# Patient Record
Sex: MALE | Race: WHITE | NOT HISPANIC OR LATINO | Employment: OTHER | URBAN - NONMETROPOLITAN AREA
[De-identification: names, ages, dates, MRNs, and addresses within clinical notes are randomized per-mention and may not be internally consistent; named-entity substitution may affect disease eponyms.]

---

## 2017-06-05 ENCOUNTER — TELEPHONE (OUTPATIENT)
Dept: SURGERY | Facility: CLINIC | Age: 77
End: 2017-06-05

## 2017-06-05 NOTE — TELEPHONE ENCOUNTER
pt canceled 1 yr f/u h-pylori stated he was going to PCP Nemesio Sanford first and then he would call and make appt with Dr Obando

## 2017-06-12 ENCOUNTER — OFFICE VISIT (OUTPATIENT)
Dept: SURGERY | Facility: CLINIC | Age: 77
End: 2017-06-12

## 2017-06-12 VITALS
OXYGEN SATURATION: 98 % | RESPIRATION RATE: 16 BRPM | SYSTOLIC BLOOD PRESSURE: 130 MMHG | BODY MASS INDEX: 23.29 KG/M2 | TEMPERATURE: 98.2 F | DIASTOLIC BLOOD PRESSURE: 74 MMHG | WEIGHT: 148.4 LBS | HEART RATE: 59 BPM | HEIGHT: 67 IN

## 2017-06-12 DIAGNOSIS — B96.81 HELICOBACTER PYLORI GASTRITIS: ICD-10-CM

## 2017-06-12 DIAGNOSIS — K29.70 HELICOBACTER PYLORI GASTRITIS: ICD-10-CM

## 2017-06-12 DIAGNOSIS — K21.00 GASTROESOPHAGEAL REFLUX DISEASE WITH ESOPHAGITIS: Primary | ICD-10-CM

## 2017-06-12 PROCEDURE — 99214 OFFICE O/P EST MOD 30 MIN: CPT | Performed by: SURGERY

## 2017-06-12 RX ORDER — EZETIMIBE 10 MG/1
10 TABLET ORAL DAILY
COMMUNITY

## 2017-06-12 RX ORDER — OMEPRAZOLE 20 MG/1
20 CAPSULE, DELAYED RELEASE ORAL DAILY
COMMUNITY
End: 2020-12-11

## 2017-06-12 RX ORDER — CITALOPRAM 10 MG/1
10 TABLET ORAL DAILY
COMMUNITY

## 2017-06-12 RX ORDER — ATORVASTATIN CALCIUM 20 MG/1
20 TABLET, FILM COATED ORAL DAILY
COMMUNITY

## 2017-06-12 NOTE — PROGRESS NOTES
"Patient: Eulalio Frazier    YOB: 1940    Date: 06/12/2017    Primary Care Provider: Ady Sanford MD    Reason for Consultation: history of h-pylori    Chief Complaint:   Chief Complaint   Patient presents with   • Follow-up     1 year for h-pylori       History: Patient is here for 1 year follow up after he was diagnosed with h-pylori and having an EGD. He states he drinks boost every day. He was suffering from weight loss due to decreased appetite. He denies any N/V/D today. Patient improving with PPI medication.  Comes in to see if gastritis has resolved.  Still having weight loss unfortunately.    Review of Systems   Constitutional: Negative for chills, fever and unexpected weight change.   HENT: Negative for trouble swallowing and voice change.    Eyes: Negative for visual disturbance.   Respiratory: Negative for apnea, cough, chest tightness, shortness of breath and wheezing.    Cardiovascular: Negative for chest pain, palpitations and leg swelling.   Gastrointestinal: Negative for abdominal distention, abdominal pain, anal bleeding, blood in stool, constipation, diarrhea, nausea, rectal pain and vomiting.   Endocrine: Negative for cold intolerance and heat intolerance.   Genitourinary: Negative for difficulty urinating, dysuria, flank pain, scrotal swelling and testicular pain.   Musculoskeletal: Negative for back pain, gait problem and joint swelling.   Skin: Negative for color change, rash and wound.   Neurological: Negative for dizziness, syncope, speech difficulty, weakness, numbness and headaches.   Hematological: Negative for adenopathy. Does not bruise/bleed easily.   Psychiatric/Behavioral: Negative for confusion. The patient is not nervous/anxious.        Vital Signs  /74  Pulse 59  Temp 98.2 °F (36.8 °C) (Temporal Artery )   Resp 16  Ht 66.5\" (168.9 cm)  Wt 148 lb 6.4 oz (67.3 kg)  SpO2 98%  BMI 23.59 kg/m2    Allergies:  Allergies   Allergen Reactions   • Sulfa " Antibiotics        Medications:    Current Outpatient Prescriptions:   •  atorvastatin (LIPITOR) 20 MG tablet, Take 20 mg by mouth Daily., Disp: , Rfl:   •  citalopram (CeleXA) 10 MG tablet, Take 10 mg by mouth Daily., Disp: , Rfl:   •  ezetimibe (ZETIA) 10 MG tablet, Take 10 mg by mouth Daily., Disp: , Rfl:   •  omeprazole (priLOSEC) 20 MG capsule, Take 20 mg by mouth Daily., Disp: , Rfl:   •  Prenatal Vit-Fe Fumarate-FA (M-VIT PO), Take 1 tablet by mouth Daily., Disp: , Rfl:   •  saw palmetto 160 MG capsule, Take 160 mg by mouth 2 (Two) Times a Day., Disp: , Rfl:     Physical Exam:   General Appearance:    Alert, cooperative, in no acute distress   Head:    Normocephalic, without obvious abnormality, atraumatic   Lungs:     Clear to auscultation,respirations regular, even and                  unlabored    Heart:    Regular rhythm and normal rate, normal S1 and S2, no            murmur, no gallop, no rub, no click   Abdomen:     Normal bowel sounds, no masses, no organomegaly, soft        non-tender, non-distended, no guarding, no rebound                tenderness   Extremities:   Moves all extremities well, no edema, no cyanosis, no             redness   Pulses:   Pulses palpable and equal bilaterally   Skin:   No bleeding, bruising or rash      Assessment/Plan     1. Gastroesophageal reflux disease with esophagitis    2. Helicobacter pylori gastritis        Fabian Obando MD  06/12/17

## 2017-06-20 ENCOUNTER — TELEPHONE (OUTPATIENT)
Dept: SURGERY | Facility: CLINIC | Age: 77
End: 2017-06-20

## 2018-06-21 ENCOUNTER — APPOINTMENT (OUTPATIENT)
Dept: GENERAL RADIOLOGY | Facility: HOSPITAL | Age: 78
End: 2018-06-21

## 2018-06-21 ENCOUNTER — HOSPITAL ENCOUNTER (EMERGENCY)
Facility: HOSPITAL | Age: 78
Discharge: HOME | End: 2018-06-21
Attending: EMERGENCY MEDICINE | Admitting: EMERGENCY MEDICINE

## 2018-06-21 VITALS
DIASTOLIC BLOOD PRESSURE: 73 MMHG | WEIGHT: 144.6 LBS | BODY MASS INDEX: 22.7 KG/M2 | HEART RATE: 64 BPM | TEMPERATURE: 98 F | RESPIRATION RATE: 16 BRPM | OXYGEN SATURATION: 100 % | HEIGHT: 67 IN | SYSTOLIC BLOOD PRESSURE: 153 MMHG

## 2018-06-21 DIAGNOSIS — M54.2 NECK PAIN: Primary | ICD-10-CM
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
## 2018-06-21 PROCEDURE — 71046 X-RAY EXAM CHEST 2 VIEWS: CPT

## 2018-06-21 PROCEDURE — 99282 EMERGENCY DEPT VISIT SF MDM: CPT

## 2018-06-21 PROCEDURE — 72040 X-RAY EXAM NECK SPINE 2-3 VW: CPT

## 2019-04-26 ENCOUNTER — OFFICE VISIT (OUTPATIENT)
Dept: PULMONOLOGY | Facility: CLINIC | Age: 79
End: 2019-04-26

## 2019-04-26 VITALS
HEART RATE: 63 BPM | WEIGHT: 154 LBS | OXYGEN SATURATION: 98 % | DIASTOLIC BLOOD PRESSURE: 70 MMHG | RESPIRATION RATE: 16 BRPM | BODY MASS INDEX: 24.17 KG/M2 | HEIGHT: 67 IN | SYSTOLIC BLOOD PRESSURE: 118 MMHG

## 2019-04-26 DIAGNOSIS — R91.8 LUNG NODULE, MULTIPLE: Primary | ICD-10-CM

## 2019-04-26 PROCEDURE — 99204 OFFICE O/P NEW MOD 45 MIN: CPT | Performed by: INTERNAL MEDICINE

## 2019-04-26 RX ORDER — METOPROLOL SUCCINATE 25 MG/1
TABLET, EXTENDED RELEASE ORAL
COMMUNITY
Start: 2019-04-23 | End: 2020-12-11

## 2019-04-26 NOTE — PROGRESS NOTES
"CONSULT NOTE    Requested by:   Baylee Lockwood AP*   Ady Sanford MD      Chief Complaint   Patient presents with   • Consult   • Breathing Problem       Subjective:  Euallio Frazier is a 78 y.o. male.     History of Present Illness   Patient comes in today for consultation because of abnormal CT.    Patient underwent a CT due to flu like symptoms. he was told that the imaging study showed 2-3 <6 millimeter lung nodule for which a pulmonology consultation was requested    Patient says that he is a non-smoker. The patient describes no family members with a history of lung cancer.        The following portions of the patient's history were reviewed and updated as appropriate: allergies, current medications, past family history, past medical history, past social history and past surgical history.    Review of Systems   HENT: Negative for sinus pressure, sneezing and sore throat.    Respiratory: Negative for cough, chest tightness, shortness of breath and wheezing.    Cardiovascular: Negative for palpitations and leg swelling.   Psychiatric/Behavioral: Negative for sleep disturbance.   All other systems reviewed and are negative.      Past Medical History:   Diagnosis Date   • Anxiety    • GERD (gastroesophageal reflux disease)    • H/O pyloric stenosis    • Hyperlipidemia    • Insomnia        Social History     Tobacco Use   • Smoking status: Never Smoker   • Smokeless tobacco: Never Used   Substance Use Topics   • Alcohol use: No         Objective:  Visit Vitals  /70   Pulse 63   Resp 16   Ht 170.2 cm (67.01\")   Wt 69.9 kg (154 lb)   SpO2 98%   BMI 24.11 kg/m²       Physical Exam   Constitutional: He is oriented to person, place, and time. He appears well-developed and well-nourished.   HENT:   Head: Atraumatic.   Eyes: EOM are normal.   Neck: Neck supple. No JVD present. No thyromegaly present.   Cardiovascular: Normal rate and regular rhythm.   No murmur heard.  Pulmonary/Chest: Effort normal. No " respiratory distress. He has no wheezes. He has no rales.   Musculoskeletal:   Gait was normal.   Neurological: He is alert and oriented to person, place, and time.   Skin: Skin is warm and dry.   Psychiatric: He has a normal mood and affect. His behavior is normal.   Vitals reviewed.      Assessment/Plan:  uElalio was seen today for consult and breathing problem.    Diagnoses and all orders for this visit:    Lung nodule, multiple  -     CT Chest Without Contrast; Future        Return in about 1 year (around 4/26/2020) for Recheck, To be seen in [place].    DISCUSSION(if any):  CT reviewed with the patient. Nodules were reviewed with him.      Based on the history obtained today, and based on the latest guidelines, the patient will need a repeat CT chest in 12 months.    Based on the results of the CT scan, further recommendations will be made.    The patient was asked to call this office if he develops any weight loss, hemoptysis, night sweats etc.      Dictated utilizing Dragon dictation.    This document was electronically signed by Piedad Garrido MD on 04/26/19 at 10:09 AM

## 2020-07-10 ENCOUNTER — OFFICE VISIT (OUTPATIENT)
Dept: PULMONOLOGY | Facility: CLINIC | Age: 80
End: 2020-07-10

## 2020-07-10 VITALS
RESPIRATION RATE: 18 BRPM | WEIGHT: 148 LBS | DIASTOLIC BLOOD PRESSURE: 60 MMHG | TEMPERATURE: 98.6 F | BODY MASS INDEX: 23.78 KG/M2 | HEIGHT: 66 IN | SYSTOLIC BLOOD PRESSURE: 110 MMHG | HEART RATE: 60 BPM | OXYGEN SATURATION: 96 %

## 2020-07-10 DIAGNOSIS — J30.89 OTHER ALLERGIC RHINITIS: ICD-10-CM

## 2020-07-10 DIAGNOSIS — R93.89 ABNORMAL CT OF THE CHEST: ICD-10-CM

## 2020-07-10 DIAGNOSIS — R91.8 LUNG NODULE, MULTIPLE: Primary | ICD-10-CM

## 2020-07-10 PROCEDURE — 99213 OFFICE O/P EST LOW 20 MIN: CPT | Performed by: INTERNAL MEDICINE

## 2020-07-10 RX ORDER — ZOLPIDEM TARTRATE 10 MG/1
TABLET ORAL
COMMUNITY
Start: 2020-06-26

## 2020-07-10 RX ORDER — ESOMEPRAZOLE MAGNESIUM 40 MG/1
40 CAPSULE, DELAYED RELEASE ORAL
COMMUNITY

## 2020-07-10 RX ORDER — FLUNISOLIDE 0.25 MG/ML
1 SOLUTION NASAL DAILY
Qty: 1 BOTTLE | Refills: 5 | Status: SHIPPED | OUTPATIENT
Start: 2020-07-10 | End: 2022-03-22 | Stop reason: SDUPTHER

## 2020-12-07 NOTE — PROGRESS NOTES
Patient: Eulalio Frazier    YOB: 1940    Date: 12/11/2020    Primary Care Provider: Ady Sanford MD    Chief Complaint   Patient presents with   • Follow-up     Patient had positive cologuard test. He is here for evaluation fro colonoscopy.       SUBJECTIVE:    History of present illness:  I saw the patient in the office today as a consultation for evaluation and treatment of positive cologuard.  Last colonoscopy 3-1/2 to 4 years ago.  Patient with history of polyps.  No rectal bleeding or abdominal pain.    The following portions of the patient's history were reviewed and updated as appropriate: allergies, current medications, past family history, past medical history, past social history, past surgical history and problem list.    Review of Systems   Constitutional: Negative for chills, fever and unexpected weight change.   HENT: Negative for trouble swallowing and voice change.    Eyes: Negative for visual disturbance.   Respiratory: Negative for apnea, cough, chest tightness and wheezing.    Cardiovascular: Negative for chest pain, palpitations and leg swelling.   Gastrointestinal: Negative for abdominal distention, abdominal pain, anal bleeding, blood in stool, constipation, diarrhea, nausea, rectal pain and vomiting.   Endocrine: Negative for cold intolerance and heat intolerance.   Genitourinary: Negative for difficulty urinating, dysuria, flank pain and hematuria.   Musculoskeletal: Negative for back pain, gait problem and joint swelling.   Skin: Negative for color change, rash and wound.   Neurological: Negative for dizziness, syncope, speech difficulty, weakness, numbness and headaches.   Hematological: Negative for adenopathy. Does not bruise/bleed easily.   Psychiatric/Behavioral: Negative for confusion. The patient is not nervous/anxious.        History:  Past Medical History:   Diagnosis Date   • Anxiety    • GERD (gastroesophageal reflux disease)    • H/O pyloric stenosis    •  "Hyperlipidemia    • Insomnia        Past Surgical History:   Procedure Laterality Date   • CERVICAL FUSION     • ENDOSCOPY  2016   • GALLBLADDER SURGERY     • HERNIA REPAIR         History reviewed. No pertinent family history.    Social History     Tobacco Use   • Smoking status: Never Smoker   • Smokeless tobacco: Never Used   Substance Use Topics   • Alcohol use: No   • Drug use: No       Medications:   Current Outpatient Medications:   •  atorvastatin (LIPITOR) 20 MG tablet, Take 20 mg by mouth Daily., Disp: , Rfl:   •  citalopram (CeleXA) 10 MG tablet, Take 10 mg by mouth Daily., Disp: , Rfl:   •  esomeprazole (nexIUM) 40 MG capsule, Take 40 mg by mouth Every Morning Before Breakfast., Disp: , Rfl:   •  ezetimibe (ZETIA) 10 MG tablet, Take 10 mg by mouth Daily., Disp: , Rfl:   •  flunisolide (NASALIDE) 25 MCG/ACT (0.025%) solution nasal spray, Inhale 1 spray Daily., Disp: 1 bottle, Rfl: 5  •  Multiple Vitamin (MULTI-VITAMIN DAILY PO), Take  by mouth., Disp: , Rfl:   •  zolpidem (AMBIEN) 10 MG tablet, , Disp: , Rfl:        Allergies:   Allergies   Allergen Reactions   • Sulfa Antibiotics        OBJECTIVE:    Vital Signs:  Vitals:    12/11/20 1532   BP: 120/70   Pulse: 70   Temp: 97 °F (36.1 °C)   SpO2: 98%   Weight: 66.6 kg (146 lb 12.8 oz)   Height: 167.6 cm (66\")       Physical Exam:   General Appearance:    Alert, cooperative, in no acute distress   Head:    Normocephalic, without obvious abnormality, atraumatic   Eyes:            Lids and lashes normal, conjunctivae and sclerae normal, no   icterus, no pallor, corneas clear, PERRL   Ears:    Ears appear intact with no abnormalities noted   Throat:   No oral lesions, no thrush, oral mucosa moist   Neck:   No adenopathy, supple, trachea midline, no thyromegaly,  no JVD   Lungs:     Clear to auscultation,respirations regular, even and                  unlabored    Heart:    Regular rhythm and normal rate, normal S1 and S2, no            murmur   Abdomen:     no " masses, no organomegaly, soft non-tender, non-distended, no guarding.  No abdominal wall masses or hernias.   Extremities:   Moves all extremities well, no edema, no cyanosis, no             redness   Pulses:   Pulses palpable and equal bilaterally   Skin:   No bleeding, bruising or rash   Lymph nodes:   No palpable adenopathy   Neurologic:   Cranial nerves 2 - 12 grossly intact, sensation intact  Psychiatric: No evidence of depression or anxiety   Results Review:   I reviewed the patient's new clinical results.    Review of Systems was reviewed and confirmed as accurate as documented by the MA.    ASSESSMENT/PLAN:    1. Heme positive stool    2. History of colon polyps        I recommend a colonoscopy for further evaluation. The procedure was explained as well as the risks which include but are not limited to bleeding, infection, perforation, abdominal pain etc. The patient understands these risks and the procedure and wishes to proceed.  Continue high-fiber diet as well as probiotics daily to alleviate and help colon health.     Electronically signed by Fabian Obando MD  12/11/20  10:31 EST

## 2020-12-11 ENCOUNTER — OFFICE VISIT (OUTPATIENT)
Dept: SURGERY | Facility: CLINIC | Age: 80
End: 2020-12-11

## 2020-12-11 VITALS
TEMPERATURE: 97 F | WEIGHT: 146.8 LBS | OXYGEN SATURATION: 98 % | SYSTOLIC BLOOD PRESSURE: 120 MMHG | BODY MASS INDEX: 23.59 KG/M2 | HEIGHT: 66 IN | HEART RATE: 70 BPM | DIASTOLIC BLOOD PRESSURE: 70 MMHG

## 2020-12-11 DIAGNOSIS — R19.5 HEME POSITIVE STOOL: Primary | ICD-10-CM

## 2020-12-11 DIAGNOSIS — Z01.818 PRE-OP TESTING: Primary | ICD-10-CM

## 2020-12-11 DIAGNOSIS — Z86.010 HISTORY OF COLON POLYPS: ICD-10-CM

## 2020-12-11 PROCEDURE — 99213 OFFICE O/P EST LOW 20 MIN: CPT | Performed by: SURGERY

## 2020-12-11 RX ORDER — POLYETHYLENE GLYCOL 3350 17 G/17G
238 POWDER, FOR SOLUTION ORAL ONCE
Qty: 14 PACKET | Refills: 0 | Status: SHIPPED | OUTPATIENT
Start: 2020-12-11 | End: 2020-12-11

## 2020-12-11 RX ORDER — BISACODYL 5 MG
TABLET, DELAYED RELEASE (ENTERIC COATED) ORAL
Qty: 4 TABLET | Refills: 0 | Status: SHIPPED | OUTPATIENT
Start: 2020-12-11

## 2020-12-26 ENCOUNTER — LAB (OUTPATIENT)
Dept: LAB | Facility: HOSPITAL | Age: 80
End: 2020-12-26

## 2020-12-26 DIAGNOSIS — Z01.818 PRE-OP TESTING: ICD-10-CM

## 2020-12-26 LAB — SARS-COV-2 RNA RESP QL NAA+PROBE: NOT DETECTED

## 2020-12-26 PROCEDURE — U0004 COV-19 TEST NON-CDC HGH THRU: HCPCS

## 2020-12-26 PROCEDURE — C9803 HOPD COVID-19 SPEC COLLECT: HCPCS

## 2020-12-29 ENCOUNTER — OUTSIDE FACILITY SERVICE (OUTPATIENT)
Dept: SURGERY | Facility: CLINIC | Age: 80
End: 2020-12-29

## 2020-12-29 ENCOUNTER — HOSPITAL ENCOUNTER (EMERGENCY)
Facility: HOSPITAL | Age: 80
Discharge: HOME | End: 2020-12-29
Attending: EMERGENCY MEDICINE | Admitting: EMERGENCY MEDICINE

## 2020-12-29 VITALS
HEART RATE: 87 BPM | BODY MASS INDEX: 23.98 KG/M2 | HEIGHT: 66 IN | SYSTOLIC BLOOD PRESSURE: 124 MMHG | WEIGHT: 149.2 LBS | DIASTOLIC BLOOD PRESSURE: 66 MMHG | OXYGEN SATURATION: 96 % | RESPIRATION RATE: 16 BRPM | TEMPERATURE: 98.7 F

## 2020-12-29 DIAGNOSIS — Z87.438 HISTORY OF BPH: ICD-10-CM

## 2020-12-29 DIAGNOSIS — R33.8 ACUTE URINARY RETENTION: Primary | ICD-10-CM

## 2020-12-29 LAB
BACTERIA UR QL AUTO: ABNORMAL /HPF
BILIRUB UR QL STRIP: NEGATIVE
CLARITY UR: CLEAR
COLOR UR: YELLOW
GLUCOSE UR STRIP-MCNC: NEGATIVE MG/DL
HGB UR QL STRIP.AUTO: NEGATIVE
HYALINE CASTS UR QL AUTO: ABNORMAL /LPF
KETONES UR QL STRIP: NEGATIVE
LEUKOCYTE ESTERASE UR QL STRIP.AUTO: NEGATIVE
NITRITE UR QL STRIP: NEGATIVE
PH UR STRIP.AUTO: 6.5 [PH] (ref 5–8)
PROT UR QL STRIP: NEGATIVE
RBC # UR: ABNORMAL /HPF
REF LAB TEST METHOD: ABNORMAL
SP GR UR STRIP: 1.01 (ref 1–1.03)
SQUAMOUS #/AREA URNS HPF: ABNORMAL /HPF
UROBILINOGEN UR QL STRIP: NORMAL
WBC UR QL AUTO: ABNORMAL /HPF

## 2020-12-29 PROCEDURE — 99283 EMERGENCY DEPT VISIT LOW MDM: CPT

## 2020-12-29 PROCEDURE — 45384 COLONOSCOPY W/LESION REMOVAL: CPT | Performed by: SURGERY

## 2020-12-29 PROCEDURE — 51702 INSERT TEMP BLADDER CATH: CPT

## 2020-12-29 PROCEDURE — 81001 URINALYSIS AUTO W/SCOPE: CPT | Performed by: EMERGENCY MEDICINE

## 2021-03-09 DIAGNOSIS — R93.89 ABNORMAL CT OF THE CHEST: ICD-10-CM

## 2021-03-09 DIAGNOSIS — R91.8 LUNG NODULE, MULTIPLE: ICD-10-CM

## 2021-05-18 ENCOUNTER — OFFICE VISIT (OUTPATIENT)
Dept: PULMONOLOGY | Facility: CLINIC | Age: 81
End: 2021-05-18

## 2021-05-18 VITALS
BODY MASS INDEX: 23.46 KG/M2 | RESPIRATION RATE: 16 BRPM | OXYGEN SATURATION: 96 % | DIASTOLIC BLOOD PRESSURE: 62 MMHG | HEIGHT: 66 IN | HEART RATE: 78 BPM | WEIGHT: 146 LBS | SYSTOLIC BLOOD PRESSURE: 104 MMHG

## 2021-05-18 DIAGNOSIS — R93.89 ABNORMAL CT OF THE CHEST: ICD-10-CM

## 2021-05-18 DIAGNOSIS — R91.8 LUNG NODULE, MULTIPLE: Primary | ICD-10-CM

## 2021-05-18 PROCEDURE — 99213 OFFICE O/P EST LOW 20 MIN: CPT | Performed by: INTERNAL MEDICINE

## 2021-05-18 NOTE — PROGRESS NOTES
"  Chief Complaint   Patient presents with   • Follow-up     Lung Nodule          Subjective   Eulalio Frazier is a 80 y.o. male.     History of Present Illness   Patient was evaluated today to discuss the results of CT scan.    The patient denies any night sweats, weight loss or hemoptysis.       The following portions of the patient's history were reviewed and updated as appropriate: allergies, current medications, past family history, past medical history, past social history and past surgical history.    Review of Systems   Constitutional: Negative for chills and fever.   HENT: Positive for rhinorrhea, sinus pressure and sneezing. Negative for sore throat.    Respiratory: Positive for cough. Negative for shortness of breath and wheezing.    Psychiatric/Behavioral: Negative for sleep disturbance.       Objective   Visit Vitals  /62   Pulse 78   Resp 16   Ht 167.6 cm (66\")   Wt 66.2 kg (146 lb)   SpO2 96%   BMI 23.57 kg/m²       Physical Exam  Vitals reviewed.   Constitutional:       Appearance: He is well-developed.   Neck:      Vascular: No JVD.   Pulmonary:      Effort: Pulmonary effort is normal. No respiratory distress.      Breath sounds: Normal breath sounds. No wheezing or rales.   Musculoskeletal:      Cervical back: Neck supple.      Comments: Gait was normal   Skin:     General: Skin is warm and dry.   Neurological:      Mental Status: He is alert and oriented to person, place, and time.         Assessment/Plan   Diagnoses and all orders for this visit:    1. Lung nodule, multiple (Primary)  -     CT Chest Without Contrast Diagnostic; Future    2. Abnormal CT of the chest  -     CT Chest Without Contrast Diagnostic; Future           Return in about 5 months (around 10/18/2021) for Recheck, Imaging, For Jamilah.    DISCUSSION (if any):  Patient was evaluated today to discuss the results of CT scan & Last CT scan was reviewed in great detail with the patient. Images reviewed personally.   Results for " orders placed in visit on 03/09/21  CT Chest Without Contrast          The CT scan shows that the lung nodules have remained stable. There was no acute change/process identified    Repeat CT will be scheduled for Sept 2021.     If the next CT shows no change, then we will consider a CT in 1 year.     Total time of patient care on day of service including time prior to, face to face with patient, and following visit spent in reviewing imaging studies, discussion with patient, and documentation/charting was 24 minutes.    Dictated utilizing Dragon dictation.    This document was electronically signed by Piedad Grarido MD on 05/18/21 at 14:59 EDT

## 2021-10-21 DIAGNOSIS — R93.89 ABNORMAL CT OF THE CHEST: ICD-10-CM

## 2021-10-21 DIAGNOSIS — R91.8 LUNG NODULE, MULTIPLE: Primary | ICD-10-CM

## 2021-11-01 DIAGNOSIS — R93.89 ABNORMAL CT OF THE CHEST: ICD-10-CM

## 2021-11-01 DIAGNOSIS — R91.8 LUNG NODULE, MULTIPLE: ICD-10-CM

## 2022-03-22 ENCOUNTER — OFFICE VISIT (OUTPATIENT)
Dept: PULMONOLOGY | Facility: CLINIC | Age: 82
End: 2022-03-22

## 2022-03-22 VITALS
BODY MASS INDEX: 24.43 KG/M2 | SYSTOLIC BLOOD PRESSURE: 136 MMHG | HEIGHT: 66 IN | OXYGEN SATURATION: 99 % | HEART RATE: 76 BPM | WEIGHT: 152 LBS | DIASTOLIC BLOOD PRESSURE: 68 MMHG

## 2022-03-22 DIAGNOSIS — R91.8 LUNG NODULE, MULTIPLE: Primary | ICD-10-CM

## 2022-03-22 DIAGNOSIS — R93.89 ABNORMAL CT OF THE CHEST: ICD-10-CM

## 2022-03-22 DIAGNOSIS — J20.9 ACUTE BRONCHITIS, UNSPECIFIED ORGANISM: ICD-10-CM

## 2022-03-22 DIAGNOSIS — R05.9 COUGH: ICD-10-CM

## 2022-03-22 PROCEDURE — 99214 OFFICE O/P EST MOD 30 MIN: CPT | Performed by: NURSE PRACTITIONER

## 2022-03-22 PROCEDURE — 95012 NITRIC OXIDE EXP GAS DETER: CPT | Performed by: NURSE PRACTITIONER

## 2022-03-22 RX ORDER — FLUTICASONE FUROATE 200 UG/1
1 POWDER RESPIRATORY (INHALATION) DAILY
Qty: 30 EACH | Refills: 2 | Status: SHIPPED | OUTPATIENT
Start: 2022-03-22

## 2022-03-22 RX ORDER — AMOXICILLIN AND CLAVULANATE POTASSIUM 875; 125 MG/1; MG/1
TABLET, FILM COATED ORAL
COMMUNITY
Start: 2022-01-18

## 2022-03-22 RX ORDER — MONTELUKAST SODIUM 10 MG/1
10 TABLET ORAL DAILY
COMMUNITY
Start: 2022-01-14

## 2022-03-22 RX ORDER — FLUNISOLIDE 0.25 MG/ML
1 SOLUTION NASAL DAILY
Qty: 25 ML | Refills: 6 | Status: SHIPPED | OUTPATIENT
Start: 2022-03-22

## 2022-03-22 RX ORDER — PREDNISONE 20 MG/1
TABLET ORAL
Qty: 10 TABLET | Refills: 0 | Status: SHIPPED | OUTPATIENT
Start: 2022-03-22

## 2022-10-31 ENCOUNTER — HOSPITAL ENCOUNTER (OUTPATIENT)
Dept: CT IMAGING | Facility: HOSPITAL | Age: 82
Discharge: HOME | End: 2022-10-31
Admitting: NURSE PRACTITIONER

## 2022-10-31 DIAGNOSIS — R93.89 ABNORMAL CT OF THE CHEST: ICD-10-CM

## 2022-10-31 DIAGNOSIS — R91.8 LUNG NODULE, MULTIPLE: ICD-10-CM

## 2022-10-31 PROCEDURE — 71250 CT THORAX DX C-: CPT

## 2022-11-01 DIAGNOSIS — R91.8 LUNG NODULE, MULTIPLE: Primary | ICD-10-CM

## 2022-11-22 ENCOUNTER — OFFICE VISIT (OUTPATIENT)
Dept: PULMONOLOGY | Facility: CLINIC | Age: 82
End: 2022-11-22

## 2022-11-22 VITALS
BODY MASS INDEX: 25.23 KG/M2 | DIASTOLIC BLOOD PRESSURE: 72 MMHG | WEIGHT: 157 LBS | OXYGEN SATURATION: 97 % | HEIGHT: 66 IN | SYSTOLIC BLOOD PRESSURE: 124 MMHG | HEART RATE: 70 BPM

## 2022-11-22 DIAGNOSIS — R91.8 LUNG NODULE, MULTIPLE: Primary | ICD-10-CM

## 2022-11-22 DIAGNOSIS — R93.89 ABNORMAL CT OF THE CHEST: ICD-10-CM

## 2022-11-22 PROCEDURE — 99213 OFFICE O/P EST LOW 20 MIN: CPT | Performed by: INTERNAL MEDICINE

## 2022-11-22 RX ORDER — PANTOPRAZOLE SODIUM 40 MG/1
TABLET, DELAYED RELEASE ORAL
COMMUNITY
Start: 2022-10-12

## 2022-11-22 NOTE — PROGRESS NOTES
"  Chief Complaint   Patient presents with   • Follow-up     CT scan       Subjective   Eulalio Frazier is a 82 y.o. male.     History of Present Illness   Patient was evaluated today to discuss the results of CT scan.    The patient denies any night sweats, weight loss or hemoptysis.     The following portions of the patient's history were reviewed and updated as appropriate: allergies, current medications, past family history, past medical history, past social history and past surgical history.    Review of Systems   Respiratory: Negative for apnea, cough, choking, chest tightness, shortness of breath, wheezing and stridor.    Cardiovascular: Negative for chest pain, palpitations and leg swelling.       Objective   Visit Vitals  /72   Pulse 70   Ht 167.6 cm (66\")   Wt 71.2 kg (157 lb)   SpO2 97%   BMI 25.34 kg/m²       Physical Exam  Vitals reviewed.   Constitutional:       Appearance: He is well-developed.   Neck:      Vascular: No JVD.   Pulmonary:      Effort: Pulmonary effort is normal. No respiratory distress.      Breath sounds: Normal breath sounds. No wheezing or rales.   Musculoskeletal:      Cervical back: Neck supple.      Comments: Gait was normal   Skin:     General: Skin is warm and dry.   Neurological:      Mental Status: He is alert and oriented to person, place, and time.         Assessment & Plan   Diagnoses and all orders for this visit:    1. Lung nodule, multiple (Primary)  -     CT Chest Without Contrast Diagnostic; Future    2. Abnormal CT of the chest  -     CT Chest Without Contrast Diagnostic; Future           Return in about 9 months (around 8/22/2023) for Recheck, Imaging, For Jamilah Tracy).    DISCUSSION (if any):  Last CT scan was reviewed in great detail with the patient. Images reviewed personally.   Results for orders placed during the hospital encounter of 10/31/22    CT Chest Without Contrast Diagnostic    Narrative  PROCEDURE: CT CHEST WO CONTRAST " DIAGNOSTIC-    HISTORY: Lung nodules, multiple; R91.8-Other nonspecific abnormal  finding of lung field; R93.89-Abnormal findings on diagnostic imaging of  other specified body structures    COMPARISON:  None  available.    PROCEDURE:  Multiple axial CT images were obtained from the thoracic  inlet through the upper abdomen without the use of contrast.    FINDINGS:  Soft tissue windows reveal no axillary, hilar or mediastinal adenopathy.  Heart size is normal. There is coronary artery atherosclerosis. The  aorta is normal in caliber. There are no pleural or pericardial  effusions. There are multiple scattered subcentimeter pulmonary nodules.  There is a 5 mm nodule in the right middle lobe. There is a 5 mm nodule  in the left lower lobe. There is a 6 mm nodule in the right upper lobe  apex. Mild biapical scarring is seen. There is emphysema. There is a  moderate-sized hiatal hernia containing proximal stomach. The visualized  upper abdomen is unremarkable. Bone windows reveal no acute osseous  abnormalities.    Impression  Scattered subcentimeter bilateral pulmonary nodules  measuring up to 6 mm. These are most likely benign findings in the  absence of a history of cancer. A six-month to one-year follow-up exam  is recommended to document stability, or correlate with prior imaging.    319.96 mGy.cm      This study was performed with techniques to keep radiation doses as low  as reasonably achievable (ALARA). Individualized dose reduction  techniques using automated exposure control or adjustment of mA and/or  kV according to the patient size were employed.          Images were reviewed, interpreted, and dictated by GEORGI Bartlett  Transcribed by Kathi Ray PA-C.    This report was signed and finalized on 10/31/2022 3:29 PM by GEORGI Holloway.      The CT scan shows that the lung nodule have remained stable. There was no acute change/process identified    Repeat CT will be scheduled for  June/July 2023.     If the next CT is stable, then we will do one more, 1 year after the next one.         Dictated utilizing Dragon dictation.    This document was electronically signed by Piedad Garrido MD on 11/22/22 at 11:14 EST

## 2023-05-09 ENCOUNTER — HOSPITAL ENCOUNTER (OUTPATIENT)
Dept: CT IMAGING | Facility: HOSPITAL | Age: 83
Discharge: HOME | End: 2023-05-09
Admitting: INTERNAL MEDICINE
Payer: MEDICARE

## 2023-05-09 DIAGNOSIS — R91.8 LUNG NODULE, MULTIPLE: ICD-10-CM

## 2023-05-09 DIAGNOSIS — R93.89 ABNORMAL CT OF THE CHEST: ICD-10-CM

## 2023-05-09 PROCEDURE — 71250 CT THORAX DX C-: CPT

## 2023-08-22 ENCOUNTER — OFFICE VISIT (OUTPATIENT)
Dept: PULMONOLOGY | Facility: CLINIC | Age: 83
End: 2023-08-22
Payer: MEDICARE

## 2023-08-22 VITALS
HEIGHT: 66 IN | BODY MASS INDEX: 24.17 KG/M2 | OXYGEN SATURATION: 97 % | SYSTOLIC BLOOD PRESSURE: 130 MMHG | HEART RATE: 59 BPM | WEIGHT: 150.4 LBS | DIASTOLIC BLOOD PRESSURE: 72 MMHG | RESPIRATION RATE: 18 BRPM

## 2023-08-22 DIAGNOSIS — J30.9 ALLERGIC RHINITIS, UNSPECIFIED SEASONALITY, UNSPECIFIED TRIGGER: ICD-10-CM

## 2023-08-22 DIAGNOSIS — R91.8 LUNG NODULE, MULTIPLE: Primary | ICD-10-CM

## 2023-08-22 DIAGNOSIS — R93.89 ABNORMAL CT OF THE CHEST: ICD-10-CM

## 2023-08-22 PROCEDURE — 99212 OFFICE O/P EST SF 10 MIN: CPT | Performed by: NURSE PRACTITIONER

## 2023-08-22 RX ORDER — UBIDECARENONE 100 MG
100 CAPSULE ORAL DAILY
COMMUNITY

## 2023-08-22 NOTE — PROGRESS NOTES
"Chief Complaint   Patient presents with    Shortness of Breath    Follow-up         Subjective   Eulalio Frazier is a 82 y.o. male.   The patient comes in today for follow-up of abnormal CT scan.    He denies any SOB.     He has some intermittent nasal congestion and uses Nasalide.     He has never smoked.    He c/o palpitations intermittently.      The following portions of the patient's history were reviewed and updated as appropriate: allergies, current medications, past family history, past medical history, past social history, and past surgical history.      Review of Systems   HENT:  Positive for sinus pressure. Negative for sneezing and sore throat.    Respiratory:  Negative for cough, chest tightness, shortness of breath and wheezing.    Cardiovascular:  Positive for palpitations. Negative for leg swelling.       Objective   Visit Vitals  /72   Pulse 59   Resp 18   Ht 167.6 cm (66\") Comment: pt reported   Wt 68.2 kg (150 lb 6.4 oz)   SpO2 97%   BMI 24.28 kg/mý       Physical Exam  Vitals reviewed.   HENT:      Head: Atraumatic.      Mouth/Throat:      Mouth: Mucous membranes are moist.   Eyes:      Extraocular Movements: Extraocular movements intact.   Cardiovascular:      Rate and Rhythm: Normal rate and regular rhythm.   Pulmonary:      Effort: Pulmonary effort is normal. No respiratory distress.      Breath sounds: No wheezing.   Musculoskeletal:      Comments: Gait normal.   Skin:     General: Skin is warm.   Neurological:      Mental Status: He is alert and oriented to person, place, and time.         Assessment & Plan   Diagnoses and all orders for this visit:    1. Lung nodule, multiple (Primary)  -     CT Chest Without Contrast Diagnostic; Future    2. Abnormal CT of the chest  -     CT Chest Without Contrast Diagnostic; Future    3. Allergic rhinitis, unspecified seasonality, unspecified trigger           Return in about 9 months (around 5/22/2024) for Recheck, For Dr. Garrido, Imaging " studies.    DISCUSSION (if any):  He should continue nasal spray for allergic rhinitis symptoms.     If she has any SOB, night sweats I have asked him to call the office for an earlier appointment.     I reviewed CT results from May 2023.  The nodules are stable as compared to the CT from October 2022.  The patient should have a repeat CT in 1 year from this last CT to ensure stability.  The CT will be due May 2024 and has been ordered today.  Study Result    Narrative & Impression   PROCEDURE: CT CHEST WO CONTRAST DIAGNOSTIC-     HISTORY: Abnormal xray - lung nodule, < 1 cm, mod-high risk; R91.8-Other  nonspecific abnormal finding of lung field; R93.89-Abnormal findings on  diagnostic imaging of other specified body structures     COMPARISON: 10/31/2022     PROCEDURE: Axial images were obtained from the lung apex to the mid  abdomen by computed tomography. This study was performed with techniques  to keep radiation doses as low as reasonably achievable, (ALARA).  Individualized dose reduction techniques using automated exposure  control or adjustment of mA and/or kV according to the patient size were  employed.     FINDINGS:      CHEST: There is no suspicious axillary adenopathy. There is no  suspicious hilar or mediastinal adenopathy. The heart is proper size.  There is no pericardial or pleural effusion.There are stable, bilateral,  subcentimeter noncalcified pulmonary nodules. There is stable bilateral  pleural-parenchymal scarring. Recommend one-year follow-up exam in  November 2023. There is evidence of previous calcified granulomatous  disease. Arterial calcifications noted. Moderately large hiatal hernia  is similar to the prior exam. Limited images of the upper abdomen  demonstrate cholecystectomy clips.      IMPRESSION:  1. Stable, bilateral subcentimeter noncalcified pulmonary nodules from  prior exam; recommend one-year follow-up in October 2023.  2. Moderately large hiatal hernia, similar to prior.            This report was signed and finalized on 5/9/2023 9:55 AM by Fabi Zuñiga MD.     Dictated utilizing Dragon dictation.    This document was electronically signed by TIFF Robles August 22, 2023  10:39 EDT

## 2024-03-19 ENCOUNTER — HOSPITAL ENCOUNTER (EMERGENCY)
Facility: HOSPITAL | Age: 84
Discharge: HOME | End: 2024-03-19
Attending: EMERGENCY MEDICINE | Admitting: EMERGENCY MEDICINE
Payer: MEDICARE

## 2024-03-19 ENCOUNTER — TELEPHONE (OUTPATIENT)
Dept: UROLOGY | Facility: CLINIC | Age: 84
End: 2024-03-19

## 2024-03-19 ENCOUNTER — OFFICE VISIT (OUTPATIENT)
Dept: UROLOGY | Facility: CLINIC | Age: 84
End: 2024-03-19
Payer: MEDICARE

## 2024-03-19 VITALS
HEIGHT: 66 IN | OXYGEN SATURATION: 97 % | DIASTOLIC BLOOD PRESSURE: 60 MMHG | HEART RATE: 74 BPM | SYSTOLIC BLOOD PRESSURE: 128 MMHG | BODY MASS INDEX: 23.63 KG/M2 | WEIGHT: 147 LBS | TEMPERATURE: 97.8 F | RESPIRATION RATE: 17 BRPM

## 2024-03-19 VITALS
OXYGEN SATURATION: 98 % | TEMPERATURE: 97.8 F | HEIGHT: 66 IN | SYSTOLIC BLOOD PRESSURE: 135 MMHG | BODY MASS INDEX: 23.63 KG/M2 | DIASTOLIC BLOOD PRESSURE: 99 MMHG | WEIGHT: 147 LBS | HEART RATE: 70 BPM | RESPIRATION RATE: 17 BRPM

## 2024-03-19 DIAGNOSIS — R55 VASOVAGAL SYNCOPE: Primary | ICD-10-CM

## 2024-03-19 DIAGNOSIS — R33.9 RETENTION OF URINE: Primary | ICD-10-CM

## 2024-03-19 LAB
ALBUMIN SERPL-MCNC: 4 G/DL (ref 3.5–5.2)
ALBUMIN/GLOB SERPL: 1.4 G/DL
ALP SERPL-CCNC: 76 U/L (ref 39–117)
ALT SERPL W P-5'-P-CCNC: 21 U/L (ref 1–41)
ANION GAP SERPL CALCULATED.3IONS-SCNC: 19.1 MMOL/L (ref 5–15)
AST SERPL-CCNC: 21 U/L (ref 1–40)
BASOPHILS # BLD AUTO: 0.05 10*3/MM3 (ref 0–0.2)
BASOPHILS NFR BLD AUTO: 0.8 % (ref 0–1.5)
BILIRUB SERPL-MCNC: 0.5 MG/DL (ref 0–1.2)
BUN SERPL-MCNC: 18 MG/DL (ref 8–23)
BUN/CREAT SERPL: 16.1 (ref 7–25)
CALCIUM SPEC-SCNC: 8.8 MG/DL (ref 8.6–10.5)
CHLORIDE SERPL-SCNC: 94 MMOL/L (ref 98–107)
CO2 SERPL-SCNC: 18.9 MMOL/L (ref 22–29)
CREAT SERPL-MCNC: 1.12 MG/DL (ref 0.76–1.27)
DEPRECATED RDW RBC AUTO: 38.2 FL (ref 37–54)
EGFRCR SERPLBLD CKD-EPI 2021: 64.4 ML/MIN/1.73
EOSINOPHIL # BLD AUTO: 0.07 10*3/MM3 (ref 0–0.4)
EOSINOPHIL NFR BLD AUTO: 1.1 % (ref 0.3–6.2)
ERYTHROCYTE [DISTWIDTH] IN BLOOD BY AUTOMATED COUNT: 11.8 % (ref 12.3–15.4)
GLOBULIN UR ELPH-MCNC: 2.9 GM/DL
GLUCOSE SERPL-MCNC: 127 MG/DL (ref 65–99)
HCT VFR BLD AUTO: 40.9 % (ref 37.5–51)
HGB BLD-MCNC: 14.3 G/DL (ref 13–17.7)
HOLD SPECIMEN: NORMAL
HOLD SPECIMEN: NORMAL
IMM GRANULOCYTES # BLD AUTO: 0.04 10*3/MM3 (ref 0–0.05)
IMM GRANULOCYTES NFR BLD AUTO: 0.6 % (ref 0–0.5)
LYMPHOCYTES # BLD AUTO: 1.49 10*3/MM3 (ref 0.7–3.1)
LYMPHOCYTES NFR BLD AUTO: 22.8 % (ref 19.6–45.3)
MAGNESIUM SERPL-MCNC: 2 MG/DL (ref 1.6–2.4)
MCH RBC QN AUTO: 31.4 PG (ref 26.6–33)
MCHC RBC AUTO-ENTMCNC: 35 G/DL (ref 31.5–35.7)
MCV RBC AUTO: 89.9 FL (ref 79–97)
MONOCYTES # BLD AUTO: 0.56 10*3/MM3 (ref 0.1–0.9)
MONOCYTES NFR BLD AUTO: 8.6 % (ref 5–12)
NEUTROPHILS NFR BLD AUTO: 4.33 10*3/MM3 (ref 1.7–7)
NEUTROPHILS NFR BLD AUTO: 66.1 % (ref 42.7–76)
NRBC BLD AUTO-RTO: 0 /100 WBC (ref 0–0.2)
PLATELET # BLD AUTO: 193 10*3/MM3 (ref 140–450)
PMV BLD AUTO: 10.6 FL (ref 6–12)
POTASSIUM SERPL-SCNC: 3.5 MMOL/L (ref 3.5–5.2)
PROT SERPL-MCNC: 6.9 G/DL (ref 6–8.5)
RBC # BLD AUTO: 4.55 10*6/MM3 (ref 4.14–5.8)
SODIUM SERPL-SCNC: 132 MMOL/L (ref 136–145)
TROPONIN T SERPL HS-MCNC: 11 NG/L
WBC NRBC COR # BLD AUTO: 6.54 10*3/MM3 (ref 3.4–10.8)
WHOLE BLOOD HOLD COAG: NORMAL
WHOLE BLOOD HOLD SPECIMEN: NORMAL

## 2024-03-19 PROCEDURE — 83735 ASSAY OF MAGNESIUM: CPT | Performed by: EMERGENCY MEDICINE

## 2024-03-19 PROCEDURE — 99283 EMERGENCY DEPT VISIT LOW MDM: CPT

## 2024-03-19 PROCEDURE — 80053 COMPREHEN METABOLIC PANEL: CPT | Performed by: EMERGENCY MEDICINE

## 2024-03-19 PROCEDURE — 93005 ELECTROCARDIOGRAM TRACING: CPT | Performed by: EMERGENCY MEDICINE

## 2024-03-19 PROCEDURE — 85025 COMPLETE CBC W/AUTO DIFF WBC: CPT | Performed by: EMERGENCY MEDICINE

## 2024-03-19 PROCEDURE — 25810000003 SODIUM CHLORIDE 0.9 % SOLUTION: Performed by: EMERGENCY MEDICINE

## 2024-03-19 PROCEDURE — 84484 ASSAY OF TROPONIN QUANT: CPT | Performed by: EMERGENCY MEDICINE

## 2024-03-19 RX ORDER — SODIUM CHLORIDE 0.9 % (FLUSH) 0.9 %
10 SYRINGE (ML) INJECTION AS NEEDED
Status: DISCONTINUED | OUTPATIENT
Start: 2024-03-19 | End: 2024-03-19 | Stop reason: HOSPADM

## 2024-03-19 RX ADMIN — SODIUM CHLORIDE 1000 ML: 9 INJECTION, SOLUTION INTRAVENOUS at 17:17

## 2024-03-19 NOTE — ED PROVIDER NOTES
EMERGENCY DEPARTMENT ENCOUNTER    Pt Name: Eulalio Frazier  MRN: [id number]  Pt :   1940  Room Number:    Date of encounter:  3/19/2024  PCP: Ady Sanford MD  ED Provider: Nilson Pereira MD    Historian: Patient      HPI:  Chief Complaint   Patient presents with    Syncope          Context: Eulalio Frazier is a 83 y.o. male who presents to the ED c/o syncopal episode while at the urologist office, the patient was having a catheter replaced, began to feel flushed, loss consciousness.  The wife notes that the patient did not eat all day, he had a catheter removed earlier in the day after being replaced for urinary retention.  Reports significant pain during catheter placement.  He denies history of syncopal episodes previously.  Denies chest pain or shortness of breath prior to the episode      PAST MEDICAL HISTORY  Past Medical History:   Diagnosis Date    Anxiety     GERD (gastroesophageal reflux disease)     H/O pyloric stenosis     Hyperlipidemia     Insomnia          PAST SURGICAL HISTORY  Past Surgical History:   Procedure Laterality Date    CERVICAL FUSION      ENDOSCOPY  2016    GALLBLADDER SURGERY      HERNIA REPAIR           FAMILY HISTORY  History reviewed. No pertinent family history.      SOCIAL HISTORY  Social History     Socioeconomic History    Marital status: Unknown   Tobacco Use    Smoking status: Never    Smokeless tobacco: Never   Vaping Use    Vaping status: Never Used   Substance and Sexual Activity    Alcohol use: No    Drug use: No    Sexual activity: Defer         ALLERGIES  Sulfa antibiotics        REVIEW OF SYSTEMS  Review of Systems   Constitutional:  Negative for chills and fever.   HENT:  Negative for sore throat and trouble swallowing.    Eyes:  Negative for pain and redness.   Respiratory:  Negative for cough and shortness of breath.    Cardiovascular:  Negative for chest pain and leg swelling.   Gastrointestinal:  Negative for abdominal pain, nausea and  vomiting.   Genitourinary:  Negative for dysuria and urgency.   Musculoskeletal:  Negative for back pain and neck pain.   Skin:  Negative for rash and wound.   Neurological:  Positive for syncope. Negative for dizziness and weakness.        All systems reviewed and negative except for those discussed in HPI.       PHYSICAL EXAM    I have reviewed the triage vital signs and nursing notes.    ED Triage Vitals   Temp Heart Rate Resp BP SpO2   03/19/24 1603 03/19/24 1550 03/19/24 1550 03/19/24 1550 03/19/24 1550   97.8 °F (36.6 °C) 73 17 151/66 99 %      Temp src Heart Rate Source Patient Position BP Location FiO2 (%)   03/19/24 1603 -- -- -- --   Oral           Physical Exam  Constitutional:       Appearance: Normal appearance. He is not ill-appearing.   HENT:      Head: Normocephalic and atraumatic.      Right Ear: External ear normal.      Left Ear: External ear normal.      Nose: Nose normal.      Mouth/Throat:      Mouth: Mucous membranes are moist.      Pharynx: Oropharynx is clear.   Eyes:      Extraocular Movements: Extraocular movements intact.      Conjunctiva/sclera: Conjunctivae normal.      Pupils: Pupils are equal, round, and reactive to light.   Cardiovascular:      Rate and Rhythm: Normal rate and regular rhythm.      Pulses:           Radial pulses are 2+ on the right side and 2+ on the left side.      Heart sounds: No murmur heard.  Pulmonary:      Effort: Pulmonary effort is normal.      Breath sounds: Normal breath sounds.   Abdominal:      General: There is no distension.      Tenderness: There is no abdominal tenderness. There is no guarding.   Musculoskeletal:         General: No swelling or deformity.      Cervical back: Normal range of motion and neck supple.   Skin:     General: Skin is warm and dry.      Capillary Refill: Capillary refill takes less than 2 seconds.      Findings: No rash.   Neurological:      General: No focal deficit present.      Mental Status: He is alert and oriented to  person, place, and time.      GCS: GCS eye subscore is 4. GCS verbal subscore is 5. GCS motor subscore is 6.      Cranial Nerves: Cranial nerves 2-12 are intact.      Sensory: Sensation is intact.      Motor: Motor function is intact.      Coordination: Coordination is intact.      Gait: Gait is intact.            LAB RESULTS  Recent Results (from the past 24 hour(s))   Green Top (Gel)    Collection Time: 03/19/24  4:08 PM   Result Value Ref Range    Extra Tube Hold for add-ons.    Lavender Top    Collection Time: 03/19/24  4:08 PM   Result Value Ref Range    Extra Tube hold for add-on    Gold Top - SST    Collection Time: 03/19/24  4:08 PM   Result Value Ref Range    Extra Tube Hold for add-ons.    Light Blue Top    Collection Time: 03/19/24  4:08 PM   Result Value Ref Range    Extra Tube Hold for add-ons.    Comprehensive Metabolic Panel    Collection Time: 03/19/24  4:08 PM    Specimen: Blood   Result Value Ref Range    Glucose 127 (H) 65 - 99 mg/dL    BUN 18 8 - 23 mg/dL    Creatinine 1.12 0.76 - 1.27 mg/dL    Sodium 132 (L) 136 - 145 mmol/L    Potassium 3.5 3.5 - 5.2 mmol/L    Chloride 94 (L) 98 - 107 mmol/L    CO2 18.9 (L) 22.0 - 29.0 mmol/L    Calcium 8.8 8.6 - 10.5 mg/dL    Total Protein 6.9 6.0 - 8.5 g/dL    Albumin 4.0 3.5 - 5.2 g/dL    ALT (SGPT) 21 1 - 41 U/L    AST (SGOT) 21 1 - 40 U/L    Alkaline Phosphatase 76 39 - 117 U/L    Total Bilirubin 0.5 0.0 - 1.2 mg/dL    Globulin 2.9 gm/dL    A/G Ratio 1.4 g/dL    BUN/Creatinine Ratio 16.1 7.0 - 25.0    Anion Gap 19.1 (H) 5.0 - 15.0 mmol/L    eGFR 64.4 >60.0 mL/min/1.73   High Sensitivity Troponin T    Collection Time: 03/19/24  4:08 PM    Specimen: Blood   Result Value Ref Range    HS Troponin T 11 <22 ng/L   Magnesium    Collection Time: 03/19/24  4:08 PM    Specimen: Blood   Result Value Ref Range    Magnesium 2.0 1.6 - 2.4 mg/dL   CBC Auto Differential    Collection Time: 03/19/24  4:08 PM    Specimen: Blood   Result Value Ref Range    WBC 6.54 3.40 -  10.80 10*3/mm3    RBC 4.55 4.14 - 5.80 10*6/mm3    Hemoglobin 14.3 13.0 - 17.7 g/dL    Hematocrit 40.9 37.5 - 51.0 %    MCV 89.9 79.0 - 97.0 fL    MCH 31.4 26.6 - 33.0 pg    MCHC 35.0 31.5 - 35.7 g/dL    RDW 11.8 (L) 12.3 - 15.4 %    RDW-SD 38.2 37.0 - 54.0 fl    MPV 10.6 6.0 - 12.0 fL    Platelets 193 140 - 450 10*3/mm3    Neutrophil % 66.1 42.7 - 76.0 %    Lymphocyte % 22.8 19.6 - 45.3 %    Monocyte % 8.6 5.0 - 12.0 %    Eosinophil % 1.1 0.3 - 6.2 %    Basophil % 0.8 0.0 - 1.5 %    Immature Grans % 0.6 (H) 0.0 - 0.5 %    Neutrophils, Absolute 4.33 1.70 - 7.00 10*3/mm3    Lymphocytes, Absolute 1.49 0.70 - 3.10 10*3/mm3    Monocytes, Absolute 0.56 0.10 - 0.90 10*3/mm3    Eosinophils, Absolute 0.07 0.00 - 0.40 10*3/mm3    Basophils, Absolute 0.05 0.00 - 0.20 10*3/mm3    Immature Grans, Absolute 0.04 0.00 - 0.05 10*3/mm3    nRBC 0.0 0.0 - 0.2 /100 WBC       If labs were ordered, I independently reviewed the results and considered them in treating the patient.        RADIOLOGY  No Radiology Exams Resulted Within Past 24 Hours        PROCEDURES    Procedures    Interpretations    O2 Sat: The patients oxygen saturation was 98% on Room Air.  This was independently interpreted by me as Normal    EKG: I reviewed and independently interpreted the EKG as sinus rhythm at 68 bpm, normal axis, normal intervals, prolonged QRS duration right bundle branch block, no ST elevation    Cardiac Monitoring: I reviewed and independently interpreted the Rhythm Strip as Normal Sinus rhythm rate of 70      MEDICATIONS GIVEN IN ER    Medications   sodium chloride 0.9 % flush 10 mL (has no administration in time range)   sodium chloride 0.9 % flush 10 mL (has no administration in time range)   sodium chloride 0.9 % bolus 1,000 mL (0 mL Intravenous Stopped 3/19/24 1812)         MEDICAL DECISION MAKING, PROGRESS, and CONSULTS    All labs, if obtained, have been independently reviewed by me.  All radiology studies, if obtained, have been reviewed  by me and the radiologist dictating the report.  All EKG's, if obtained, have been independently viewed and interpreted by me      Discussion below represents my analysis of pertinent findings related to patient's condition, differential diagnosis, treatment plan and final disposition.      Differential diagnosis:    83-year-old male presented ED with complaint of syncope, the patient had syncopal episode while having a catheter placed, is completely asymptomatic and neurologically intact now, sounds like he likely had vasovagal syncope.  Will obtain basic labs, EKG, provide IV fluids.    Additional Sources:  Discussed/ obtained information from independent historians:  Wife      Orders placed during this visit:  Orders Placed This Encounter   Procedures    [facility] Draw    Comprehensive Metabolic Panel    High Sensitivity Troponin T    Magnesium    CBC Auto Differential    Leg Bag During The Day    ECG 12 Lead Syncope    Insert peripheral IV    Insert Peripheral IV    Green Top (Gel)    Lavender Top    Gold Top - SST    Light Blue Top    CBC & Differential         Additional orders considered but not ordered:  None    ED Course:    Consultants:  None    ED Course as of 03/19/24 1923   Tue Mar 19, 2024   1808 Patient's labs are benign, he is ambulated, he has no complaints.  Think his syncope was all likely secondary to vasovagal symptoms from his catheter placement.  Patient is requesting discharge home which I think is reasonable given that he is returned to baseline.  Will discharge him advise close follow-up with his PCP [CS]      ED Course User Index  [CS] Nilson Pereira MD           After my consideration of clinical presentation and any laboratory/radiology studies obtained, I discussed the findings with the patient/patient representative who is in agreement with the treatment plan and the final disposition. Risks and benefits of discharge were discussed.     AS OF 19:23 EDT VITALS:    BP -  135/99  HR - 70  TEMP - 97.8 °F (36.6 °C) (Oral)  O2 SATS - 98%    I reviewed the patients prescription monitoring report if available prior to discharge    DIAGNOSIS  Final diagnoses:   Vasovagal syncope         DISPOSITION  ED Disposition       ED Disposition   Discharge    Condition   Stable    Comment   --                   Please note that portions of this document were completed with voice recognition software.        Nilson Pereira MD  03/19/24 8887

## 2024-03-19 NOTE — PROGRESS NOTES
Patient came back into the office with urinary retention after getting home and drinking 8 8 oz of water. Patient was experiencing a lot of pain and was immediately brought back into the procedure room. I performed PVR on patient that showed 900 ml. I placed 16 fr coude at first I couldn't get catheter in and pulled out the cath and had dionicio come in to advise I replaced the 16 fr coude catheter with success with a blood clot that came out in the process and urine began draining which was yellow and clear. I inflated the catheter balloon with 10 ml sterile water. Patient was complaining of not feeling good and was clammy and hot. His eyes rolled back into his head and he fainted the patient was lying down when the fainting episode happened with me by bedside. I called for dionicio and she lifted the patients legs up. I did sternal rub and kept asking the patient if he could hear me when his eyes opened and I asked him if he could hear me again and he responded yes. I checked his blood pressure and it was 162/82, his heart rate was 110, and his o2 was 95 and went up to 97. Patient was asked if he was diabetic and he stated no. His wife states he hadn't ate lunch today. Patient was given a coca cola and peanut butter crackers and was lying down. Let patient lay for a few minutes and went back to check on him and he states he was feeling some better. I had patient sit up so I could recheck his vitals and he was feeling dizzy and had to lay back down. At this time his blood pressure was 122/78, his heart 63, and his o2 was 97. I advised Dionicio and she advised he go to the ER. Dionicio went into and informed patient where he was still feeling dizzy while sitting up it was best for him to go to the ER to be evaluated. Patient tried sitting up two more times and still had dizziness and had to immediately lay back down. Patient then was agreeable to go to the ER. Myself and Berny figueroa other MA took the patient down to the  ER in a wheelchair with his wife. I advised the ER of the episode and they immediately took patient back to be evaluated. Vitals were took. The CNA took patients blood sugar which was 127. Then patient was took back to a room by the RN. Patient wife was advised of mine and Hun[facility] name to call if they needed anything from us. Patient and his wife were very thankful.     CLARI Rubio

## 2024-03-19 NOTE — TELEPHONE ENCOUNTER
Provider: ALAN KAPADIA    Caller: DERIAN GIBSON    Relationship to Patient: SELF    Reason for Call: HORRIBLE RETENTION - SINCE CATH REMOVED THIS AM PATIENT IS HEADED BACK TO THE OFFICE. HE HAS TO HAVE SOME RELIEF.     When was the patient last seen: 03-19-24    When did it start: AS SOON AS HE GOT HOME HAS HAD 8 80Z GLASSES OF WATER AND CANNOT URINATE.

## 2024-06-13 ENCOUNTER — HOSPITAL ENCOUNTER (OUTPATIENT)
Dept: CT IMAGING | Facility: HOSPITAL | Age: 84
Discharge: HOME | End: 2024-06-13
Admitting: NURSE PRACTITIONER
Payer: MEDICARE

## 2024-06-13 DIAGNOSIS — R93.89 ABNORMAL CT OF THE CHEST: ICD-10-CM

## 2024-06-13 DIAGNOSIS — R91.8 LUNG NODULE, MULTIPLE: ICD-10-CM

## 2024-06-13 PROCEDURE — 71250 CT THORAX DX C-: CPT
